# Patient Record
Sex: FEMALE | Race: WHITE | Employment: FULL TIME | URBAN - METROPOLITAN AREA
[De-identification: names, ages, dates, MRNs, and addresses within clinical notes are randomized per-mention and may not be internally consistent; named-entity substitution may affect disease eponyms.]

---

## 2020-10-06 ENCOUNTER — DOCUMENTATION ONLY (OUTPATIENT)
Dept: SURGERY | Age: 55
End: 2020-10-06

## 2020-10-06 NOTE — PROGRESS NOTES
L/m confirm appointment location, arrive 15 mins early, bring cards, wear mask and come to appointment alone

## 2020-10-07 ENCOUNTER — OFFICE VISIT (OUTPATIENT)
Dept: SURGERY | Age: 55
End: 2020-10-07
Payer: COMMERCIAL

## 2020-10-07 VITALS
HEART RATE: 92 BPM | DIASTOLIC BLOOD PRESSURE: 89 MMHG | WEIGHT: 165 LBS | BODY MASS INDEX: 26.52 KG/M2 | SYSTOLIC BLOOD PRESSURE: 141 MMHG | HEIGHT: 66 IN | TEMPERATURE: 97.2 F

## 2020-10-07 DIAGNOSIS — N60.81 FLAT EPITHELIAL ATYPIA (FEA) OF RIGHT BREAST: ICD-10-CM

## 2020-10-07 DIAGNOSIS — R92.2 DENSE BREAST TISSUE: ICD-10-CM

## 2020-10-07 DIAGNOSIS — D05.01 LOBULAR CARCINOMA IN SITU (LCIS) OF RIGHT BREAST: ICD-10-CM

## 2020-10-07 DIAGNOSIS — Z91.89 AT HIGH RISK FOR BREAST CANCER: Primary | ICD-10-CM

## 2020-10-07 PROCEDURE — 99203 OFFICE O/P NEW LOW 30 MIN: CPT | Performed by: SURGERY

## 2020-10-07 RX ORDER — BISMUTH SUBSALICYLATE 262 MG
1 TABLET,CHEWABLE ORAL DAILY
COMMUNITY

## 2020-10-07 NOTE — PATIENT INSTRUCTIONS
MRI of the Breast: About This Test 
What is it? MRI (magnetic resonance imaging) is a test that uses a magnetic field and pulses of radio wave energy to make pictures of the organs and structures inside the body. An MRI can give your doctor information about your breasts, chest wall, and underarm. When you have an MRI, you lie on a table and the table moves into the MRI machine. Why is this test done? An MRI of the breast can help find breast cancer and how far along it is (its stage). It can also look for infection. How do you prepare for the test? 
In general, there's nothing you have to do before this test, unless your doctor tells you to. Tell your doctor if you get nervous in tight spaces. You may get a medicine to help you relax. If you think you'll get this medicine, be sure you have someone to take you home. How is the test done? · You may have contrast material (dye) put into your arm through a tube called an IV. · You will lie on a table that's part of the MRI scanner. · The table will slide into the space that contains the magnet. · Inside the scanner, you will hear a fan and feel air moving. You may hear tapping, thumping, or snapping noises. You may be given earplugs or headphones to reduce the noise. · You will be asked to hold still during the scan. You may be asked to hold your breath for short periods. · You may be alone in the scanning room. But a technologist will watch through a window and talk with you during the test. 
How does having an MRI of the breast feel? You won't have pain from the magnetic field or radio waves used for the MRI test. But you may be tired or sore from lying in one position for a long time. If a contrast material is used, you may feel some coolness when it is put into your IV. In rare cases, you may feel: · Tingling in the mouth if you have metal dental fillings. · Warmth in the area being checked.  This is normal. Tell the technologist if you have nausea, vomiting, a headache, dizziness, pain, burning, or breathing problems. How long does the test take? The test usually takes 30 to 60 minutes but can take as long as 2 hours. What are the risks of an MRI of the breast? 
There are no known harmful effects from the strong magnetic field used for an MRI. But the magnet is very powerful. It may affect any metal implants or other medical devices you have. An MRI may be more likely than other tests to report a problem in the breast when a problem is not there (false-positive). A false-positive result may lead to more tests such as a biopsy when no serious problem is really present. So MRI is not used as a screening test for women at low or average risk for breast cancer. Risks from contrast material 
Contrast material that contains gadolinium may be used in this test. But for most people, the benefit of its use in this test outweighs the risk. Be sure to tell your doctor if you have kidney problems or are pregnant. There is a slight chance of an allergic reaction if contrast material is used during the test. But most reactions are mild and can be treated using medicine. If you breastfeed and are concerned about whether the contrast material used in this test is safe, talk to your doctor. Most experts believe that very little dye passes into breast milk and even less is passed on to the baby. But if you are concerned, you can stop breastfeeding for up to 24 hours after the test. During this time, you can give your baby breast milk that you stored before the test. Don't use the breast milk you pump in the 24 hours after the test. Throw it out. What happens after the test? 
· You will probably be able to go home right away. It depends on the reason for the test. 
· You can go back to your usual activities right away. Follow-up care is a key part of your treatment and safety.  Be sure to make and go to all appointments, and call your doctor if you are having problems. It's also a good idea to keep a list of the medicines you take. Ask your doctor when you can expect to have your test results. Where can you learn more? Go to http://www.gray.com/ Enter W611 in the search box to learn more about \"MRI of the Breast: About This Test.\" Current as of: December 9, 2019               Content Version: 12.6 © 0334-8645 [employer]. Care instructions adapted under license by [employer] (which disclaims liability or warranty for this information). If you have questions about a medical condition or this instruction, always ask your healthcare professional. [address] any warranty or liability for your use of this information. MRI of the Breast: About This Test 
What is it? MRI (magnetic resonance imaging) is a test that uses a magnetic field and pulses of radio wave energy to make pictures of the organs and structures inside the body. An MRI can give your doctor information about your breasts, chest wall, and underarm. When you have an MRI, you lie on a table and the table moves into the MRI machine. Why is this test done? An MRI of the breast can help find breast cancer and how far along it is (its stage). It can also look for infection. How do you prepare for the test? 
In general, there's nothing you have to do before this test, unless your doctor tells you to. Tell your doctor if you get nervous in tight spaces. You may get a medicine to help you relax. If you think you'll get this medicine, be sure you have someone to take you home. How is the test done? · You may have contrast material (dye) put into your arm through a tube called an IV. · You will lie on a table that's part of the MRI scanner. · The table will slide into the space that contains the magnet. · Inside the scanner, you will hear a fan and feel air moving. You may hear tapping, thumping, or snapping noises. You may be given earplugs or headphones to reduce the noise. · You will be asked to hold still during the scan. You may be asked to hold your breath for short periods. · You may be alone in the scanning room. But a technologist will watch through a window and talk with you during the test. 
How does having an MRI of the breast feel? You won't have pain from the magnetic field or radio waves used for the MRI test. But you may be tired or sore from lying in one position for a long time. If a contrast material is used, you may feel some coolness when it is put into your IV. In rare cases, you may feel: · Tingling in the mouth if you have metal dental fillings. · Warmth in the area being checked. This is normal. Tell the technologist if you have nausea, vomiting, a headache, dizziness, pain, burning, or breathing problems. How long does the test take? The test usually takes 30 to 60 minutes but can take as long as 2 hours. What are the risks of an MRI of the breast? 
There are no known harmful effects from the strong magnetic field used for an MRI. But the magnet is very powerful. It may affect any metal implants or other medical devices you have. An MRI may be more likely than other tests to report a problem in the breast when a problem is not there (false-positive). A false-positive result may lead to more tests such as a biopsy when no serious problem is really present. So MRI is not used as a screening test for women at low or average risk for breast cancer. Risks from contrast material 
Contrast material that contains gadolinium may be used in this test. But for most people, the benefit of its use in this test outweighs the risk. Be sure to tell your doctor if you have kidney problems or are pregnant. There is a slight chance of an allergic reaction if contrast material is used during the test. But most reactions are mild and can be treated using medicine.  
If you breastfeed and are concerned about whether the contrast material used in this test is safe, talk to your doctor. Most experts believe that very little dye passes into breast milk and even less is passed on to the baby. But if you are concerned, you can stop breastfeeding for up to 24 hours after the test. During this time, you can give your baby breast milk that you stored before the test. Don't use the breast milk you pump in the 24 hours after the test. Throw it out. What happens after the test? 
· You will probably be able to go home right away. It depends on the reason for the test. 
· You can go back to your usual activities right away. Follow-up care is a key part of your treatment and safety. Be sure to make and go to all appointments, and call your doctor if you are having problems. It's also a good idea to keep a list of the medicines you take. Ask your doctor when you can expect to have your test results. Where can you learn more? Go to http://www.gray.com/ Enter E333 in the search box to learn more about \"MRI of the Breast: About This Test.\" Current as of: December 9, 2019               Content Version: 12.6 © 8215-5198 [employer]. Care instructions adapted under license by [employer] (which disclaims liability or warranty for this information). If you have questions about a medical condition or this instruction, always ask your healthcare professional. [address] any warranty or liability for your use of this information.

## 2020-10-07 NOTE — LETTER
10/12/2020 3:54 PM 
 
Patient:  Leanne Fabian YOB: 1965 Date of Visit: 10/7/2020 Dear Dr. Daphne Kelly: Thank you for referring Ms. Kely Holden to me for evaluation/treatment. Below are the relevant portions of my assessment and plan of care. If you have questions, please do not hesitate to call me. I look forward to following Ms. Holden along with you.  
 
 
 
Sincerely, 
 
 
Zofia Vance MD

## 2020-10-07 NOTE — PROGRESS NOTES
HISTORY OF PRESENT ILLNESS Joshua Tabares is a 54 y.o. female. HPI    NEW patient presents for consultation at the request of Dr. Rafael Esteves for history of RIGHT breast LCIS and flat epithelial atypia and recent abnormal mammogram.   
Had a mammogram earlier this year which recommended a six month follow-up for a questionable findings in the RIGHT breast.  She is not feeling any breast lumps, has some occasional pain in the RIGHT breast, but this is off/on. Recent mammogram and ultrasound were performed, and these were normal. 
FH - Father had prostate cancer in his 59s-75s but  of metastatic melanoma which came back after many years. Recent imaging has been at [address] [place], BIRADS 2, benign. Review of Systems Constitutional: Negative. HENT: Positive for tinnitus. Eyes: Negative. Respiratory: Negative. Cardiovascular: Negative. Gastrointestinal: Negative. Genitourinary: Negative. Musculoskeletal: Negative. Skin: Negative. Neurological: Negative. Endo/Heme/Allergies: Negative. Psychiatric/Behavioral: Negative. Physical Exam 
 
ASSESSMENT and PLAN 
{ASSESSMENT/PLAN:[id number]}

## 2020-10-07 NOTE — PROGRESS NOTES
HISTORY OF PRESENT ILLNESS  Leighann Patel is a 54 y.o. female. HPI  NEW patient presents for consultation at the request of Dr. Odin Love for history of RIGHT breast LCIS and flat epithelial atypia and recent abnormal mammogram.      Had a mammogram earlier this year which recommended a six month follow-up for a questionable findings in the RIGHT breast.  She is not feeling any breast lumps, has some occasional pain in the RIGHT breast, but this is off/on. Recent mammogram and ultrasound were performed, and these were normal.    FH - Father had prostate cancer in his 59s-75s but  of metastatic melanoma which came back after many years. Recent imaging has been at [address] [place], BIRADS 2, benign.        Past Medical History:   Diagnosis Date    Breast neoplasm, Tis (LCIS), right 2017       Past Surgical History:   Procedure Laterality Date    HX BREAST BIOPSY  2017    Excisional        Social History     Socioeconomic History    Marital status:      Spouse name: Not on file    Number of children: Not on file    Years of education: Not on file    Highest education level: Not on file   Occupational History    Not on file   Social Needs    Financial resource strain: Not on file    Food insecurity     Worry: Not on file     Inability: Not on file    Transportation needs     Medical: Not on file     Non-medical: Not on file   Tobacco Use    Smoking status: Never Smoker    Smokeless tobacco: Never Used   Substance and Sexual Activity    Alcohol use:  Yes    Drug use: Not on file    Sexual activity: Not on file   Lifestyle    Physical activity     Days per week: Not on file     Minutes per session: Not on file    Stress: Not on file   Relationships    Social connections     Talks on phone: Not on file     Gets together: Not on file     Attends [employer]ical service: Not on file     Active member of club or organization: Not on file     Attends meetings of clubs or organizations: Not on file     Relationship status: Not on file    Intimate partner violence     Fear of current or ex partner: Not on file     Emotionally abused: Not on file     Physically abused: Not on file     Forced sexual activity: Not on file   Other Topics Concern    Not on file   Social History Narrative    Not on file       Current Outpatient Medications on File Prior to Visit   Medication Sig Dispense Refill    multivitamin (ONE A DAY) tablet Take 1 Tab by mouth daily.  MAGNESIUM PO Take  by mouth.  cholecalciferol, vitamin D3, (VITAMIN D3 PO) Take  by mouth. No current facility-administered medications on file prior to visit. Allergies   Allergen Reactions    Other Medication Other (comments)     MRI contrast may have caused some itching. OB History    No obstetric history on file. Obstetric Comments   Menarche 15, LMP 2016, # of children 1, age of 4st delivery 29, Hysterectomy/oophorectomy No/No, Breast bx Yes, history of breast feeding Yes, BCP Yes, in the past, Hormone therapy No               ROS  Constitutional: Negative. HENT: Positive for tinnitus. Eyes: Negative. Respiratory: Negative. Cardiovascular: Negative. Gastrointestinal: Negative. Genitourinary: Negative. Musculoskeletal: Negative. Skin: Negative. Neurological: Negative. Endo/Heme/Allergies: Negative. Psychiatric/Behavioral: Negative. Physical Exam  Exam conducted with a chaperone present. Cardiovascular:      Rate and Rhythm: Normal rate and regular rhythm. Heart sounds: Normal heart sounds. Pulmonary:      Breath sounds: Normal breath sounds. Chest:      Breasts: Breasts are symmetrical.         Right: Normal. No swelling, bleeding, inverted nipple, mass, nipple discharge, skin change or tenderness. Left: Normal. No swelling, bleeding, inverted nipple, mass, nipple discharge, skin change or tenderness.    Lymphadenopathy:      Cervical:      Right cervical: No superficial, deep or posterior cervical adenopathy. Left cervical: No superficial, deep or posterior cervical adenopathy. Upper Body:      Right upper body: No supraclavicular or axillary adenopathy. Left upper body: No supraclavicular or axillary adenopathy. ASSESSMENT and PLAN    ICD-10-CM ICD-9-CM    1. At high risk for breast cancer  Z91.89 V49.89 MRI BREAST BI W WO CONT   2. Dense breast tissue  R92.2 793.82 MRI BREAST BI W WO CONT   3. Lobular carcinoma in situ (LCIS) of right breast  D05.01 233.0    4. Flat epithelial atypia (FEA) of right breast  N60.81 610.8       New patient presents for history RIGHT breast LCIS, FEA, and a recent abnormal mammogram, and is doing well overall. Normal dense breast tissue on exam.    Discussed LCIS as a high-risk lesion. However, 60.7% risk according to [facility] is likely overestimated. I agree with getting another breast MRI in 6 months. If normal, we discussed continuing with annual mammography with ABUS instead of annual breast MRI. Will then consider breast MRI every other year. Addressed pt's questions about chemoprevention with Tamoxifen. Discussed risks and side effects associated with Tamoxifen. Pt states she is not interested in chemoprevention at this time. Pt qualifies for enrollment in our high risk clinic that includes annual screening breast imaging and follow-up appointments with our nurse practitioner. Pt should have 2 breast exams annually, between here and her PCP or OB-GYN. Also reviewed importance of healthy diet with fruits/vegetables and healthy proteins/fats, and exercise 3 hours/week for risk reduction. Will order MRI for 6 months from now, and will recommend mammogram and ABUS in 1 year. F/U in 1 year with Kirsten Cervantes NP. This plan was reviewed with the patient and patient agrees. All questions were answered.     Written by Iris Palafox, as dictated by Dr. Daksha Muñoz MD.

## 2020-10-09 ENCOUNTER — DOCUMENTATION ONLY (OUTPATIENT)
Dept: SURGERY | Age: 55
End: 2020-10-09

## 2020-10-09 NOTE — PROGRESS NOTES
Type of Film: [x] CD [] FILMS  Type of Test: [] MRI [x] MAMMO  From: [facility]  Given to: [facility]  LOCATION  To be Downloaded into PACS:  YES

## 2021-03-24 ENCOUNTER — TELEPHONE (OUTPATIENT)
Dept: SURGERY | Age: 56
End: 2021-03-24

## 2021-03-24 ENCOUNTER — TELEPHONE (OUTPATIENT)
Dept: MRI IMAGING | Age: 56
End: 2021-03-24

## 2021-03-24 RX ORDER — PREDNISONE 50 MG/1
50 TABLET ORAL AS NEEDED
Qty: 6 TAB | Refills: 0 | Status: SHIPPED | OUTPATIENT
Start: 2021-03-24

## 2021-03-24 NOTE — TELEPHONE ENCOUNTER
Pretreat for possible allergic reaction to contrast for breast MRI    Prednisone 50mg 13, 7 and 1 hour prior to procedure    50mg Benadryl 1 hour prior to procedure. RX done. Patient notified.

## 2021-04-02 ENCOUNTER — HOSPITAL ENCOUNTER (OUTPATIENT)
Dept: MRI IMAGING | Age: 56
Discharge: HOME | End: 2021-04-02
Attending: SURGERY
Payer: COMMERCIAL

## 2021-04-02 DIAGNOSIS — Z91.89 AT HIGH RISK FOR BREAST CANCER: ICD-10-CM

## 2021-04-02 DIAGNOSIS — R92.2 DENSE BREAST TISSUE: ICD-10-CM

## 2021-04-02 PROCEDURE — 74011250636 HC RX REV CODE- 250/636: Performed by: SURGERY

## 2021-04-02 PROCEDURE — 74011000258 HC RX REV CODE- 258: Performed by: SURGERY

## 2021-04-02 PROCEDURE — 77049 MRI BREAST C-+ W/CAD BI: CPT

## 2021-04-02 PROCEDURE — A9585 GADOBUTROL INJECTION: HCPCS | Performed by: SURGERY

## 2021-04-02 RX ORDER — EPINEPHRINE 1 MG/ML
INJECTION, SOLUTION, CONCENTRATE INTRAVENOUS
Status: DISCONTINUED
Start: 2021-04-02 | End: 2021-04-02 | Stop reason: WASHOUT

## 2021-04-02 RX ORDER — DIPHENHYDRAMINE HYDROCHLORIDE 50 MG/ML
INJECTION, SOLUTION INTRAMUSCULAR; INTRAVENOUS
Status: DISCONTINUED
Start: 2021-04-02 | End: 2021-04-02 | Stop reason: WASHOUT

## 2021-04-02 RX ORDER — SODIUM CHLORIDE 0.9 % (FLUSH) 0.9 %
10 SYRINGE (ML) INJECTION
Status: COMPLETED | OUTPATIENT
Start: 2021-04-02 | End: 2021-04-02

## 2021-04-02 RX ORDER — ALBUTEROL SULFATE 0.83 MG/ML
SOLUTION RESPIRATORY (INHALATION)
Status: DISCONTINUED
Start: 2021-04-02 | End: 2021-04-02 | Stop reason: WASHOUT

## 2021-04-02 RX ADMIN — SODIUM CHLORIDE 100 ML: 900 INJECTION, SOLUTION INTRAVENOUS at 10:00

## 2021-04-02 RX ADMIN — GADOBUTROL 7.5 ML: 604.72 INJECTION INTRAVENOUS at 10:00

## 2021-04-02 RX ADMIN — Medication 10 ML: at 10:00

## 2021-04-05 ENCOUNTER — TELEPHONE (OUTPATIENT)
Dept: SURGERY | Age: 56
End: 2021-04-05

## 2021-10-07 DIAGNOSIS — Z12.39 ENCOUNTER FOR SCREENING FOR MALIGNANT NEOPLASM OF BREAST, UNSPECIFIED SCREENING MODALITY: Primary | ICD-10-CM

## 2021-10-18 ENCOUNTER — TRANSCRIBE ORDER (OUTPATIENT)
Dept: SCHEDULING | Age: 56
End: 2021-10-18

## 2021-10-28 ENCOUNTER — HOSPITAL ENCOUNTER (OUTPATIENT)
Dept: MAMMOGRAPHY | Age: 56
Discharge: HOME | End: 2021-10-28
Attending: SURGERY
Payer: COMMERCIAL

## 2021-10-28 DIAGNOSIS — Z12.39 ENCOUNTER FOR SCREENING FOR MALIGNANT NEOPLASM OF BREAST, UNSPECIFIED SCREENING MODALITY: ICD-10-CM

## 2021-10-28 PROCEDURE — 76641 ULTRASOUND BREAST COMPLETE: CPT

## 2021-10-28 PROCEDURE — 77063 BREAST TOMOSYNTHESIS BI: CPT

## 2021-10-29 ENCOUNTER — TELEPHONE (OUTPATIENT)
Dept: SURGERY | Age: 56
End: 2021-10-29

## 2022-03-18 PROBLEM — R92.2 DENSE BREAST TISSUE: Status: ACTIVE | Noted: 2020-10-07

## 2023-05-11 RX ORDER — PREDNISONE 50 MG/1
TABLET ORAL PRN
COMMUNITY
Start: 2021-03-24